# Patient Record
Sex: MALE | Race: WHITE | NOT HISPANIC OR LATINO | Employment: OTHER | URBAN - METROPOLITAN AREA
[De-identification: names, ages, dates, MRNs, and addresses within clinical notes are randomized per-mention and may not be internally consistent; named-entity substitution may affect disease eponyms.]

---

## 2022-08-05 ENCOUNTER — HOSPITAL ENCOUNTER (EMERGENCY)
Facility: CLINIC | Age: 37
Discharge: HOME | End: 2022-08-06
Attending: INTERNAL MEDICINE | Admitting: INTERNAL MEDICINE
Payer: COMMERCIAL

## 2022-08-05 ENCOUNTER — NURSE TRIAGE (OUTPATIENT)
Dept: NURSING | Facility: CLINIC | Age: 37
End: 2022-08-05

## 2022-08-05 DIAGNOSIS — U07.1 INFECTION DUE TO 2019 NOVEL CORONAVIRUS: ICD-10-CM

## 2022-08-05 LAB
ANION GAP SERPL CALCULATED.3IONS-SCNC: 10 MMOL/L (ref 7–15)
BASOPHILS # BLD AUTO: 0 10E3/UL (ref 0–0.2)
BASOPHILS NFR BLD AUTO: 1 %
BUN SERPL-MCNC: 15.4 MG/DL (ref 6–20)
CALCIUM SERPL-MCNC: 8.9 MG/DL (ref 8.6–10)
CHLORIDE SERPL-SCNC: 102 MMOL/L (ref 98–107)
CREAT SERPL-MCNC: 1.06 MG/DL (ref 0.67–1.17)
DEPRECATED HCO3 PLAS-SCNC: 26 MMOL/L (ref 22–29)
EOSINOPHIL # BLD AUTO: 0.1 10E3/UL (ref 0–0.7)
EOSINOPHIL NFR BLD AUTO: 1 %
ERYTHROCYTE [DISTWIDTH] IN BLOOD BY AUTOMATED COUNT: 12.9 % (ref 10–15)
GFR SERPL CREATININE-BSD FRML MDRD: >90 ML/MIN/1.73M2
GLUCOSE SERPL-MCNC: 114 MG/DL (ref 70–99)
HCT VFR BLD AUTO: 45 % (ref 40–53)
HGB BLD-MCNC: 15.2 G/DL (ref 13.3–17.7)
IMM GRANULOCYTES # BLD: 0 10E3/UL
IMM GRANULOCYTES NFR BLD: 0 %
LYMPHOCYTES # BLD AUTO: 0.8 10E3/UL (ref 0.8–5.3)
LYMPHOCYTES NFR BLD AUTO: 11 %
MCH RBC QN AUTO: 30.5 PG (ref 26.5–33)
MCHC RBC AUTO-ENTMCNC: 33.8 G/DL (ref 31.5–36.5)
MCV RBC AUTO: 90 FL (ref 78–100)
MONOCYTES # BLD AUTO: 0.6 10E3/UL (ref 0–1.3)
MONOCYTES NFR BLD AUTO: 7 %
NEUTROPHILS # BLD AUTO: 6 10E3/UL (ref 1.6–8.3)
NEUTROPHILS NFR BLD AUTO: 80 %
NRBC # BLD AUTO: 0 10E3/UL
NRBC BLD AUTO-RTO: 0 /100
PLATELET # BLD AUTO: 196 10E3/UL (ref 150–450)
POTASSIUM SERPL-SCNC: 4 MMOL/L (ref 3.4–5.3)
RBC # BLD AUTO: 4.99 10E6/UL (ref 4.4–5.9)
SODIUM SERPL-SCNC: 138 MMOL/L (ref 136–145)
TROPONIN T SERPL HS-MCNC: 9 NG/L
WBC # BLD AUTO: 7.5 10E3/UL (ref 4–11)

## 2022-08-05 PROCEDURE — 99284 EMERGENCY DEPT VISIT MOD MDM: CPT | Mod: 25

## 2022-08-05 PROCEDURE — 36415 COLL VENOUS BLD VENIPUNCTURE: CPT | Performed by: INTERNAL MEDICINE

## 2022-08-05 PROCEDURE — 96361 HYDRATE IV INFUSION ADD-ON: CPT

## 2022-08-05 PROCEDURE — 85379 FIBRIN DEGRADATION QUANT: CPT | Performed by: INTERNAL MEDICINE

## 2022-08-05 PROCEDURE — 80048 BASIC METABOLIC PNL TOTAL CA: CPT | Performed by: INTERNAL MEDICINE

## 2022-08-05 PROCEDURE — 93005 ELECTROCARDIOGRAM TRACING: CPT

## 2022-08-05 PROCEDURE — 96374 THER/PROPH/DIAG INJ IV PUSH: CPT

## 2022-08-05 PROCEDURE — 258N000003 HC RX IP 258 OP 636: Performed by: INTERNAL MEDICINE

## 2022-08-05 PROCEDURE — 84484 ASSAY OF TROPONIN QUANT: CPT | Performed by: INTERNAL MEDICINE

## 2022-08-05 PROCEDURE — 85025 COMPLETE CBC W/AUTO DIFF WBC: CPT | Performed by: INTERNAL MEDICINE

## 2022-08-05 PROCEDURE — 250N000011 HC RX IP 250 OP 636: Performed by: INTERNAL MEDICINE

## 2022-08-05 RX ORDER — KETOROLAC TROMETHAMINE 15 MG/ML
15 INJECTION, SOLUTION INTRAMUSCULAR; INTRAVENOUS ONCE
Status: COMPLETED | OUTPATIENT
Start: 2022-08-05 | End: 2022-08-05

## 2022-08-05 RX ORDER — SODIUM CHLORIDE 9 MG/ML
1000 INJECTION, SOLUTION INTRAVENOUS CONTINUOUS
Status: DISCONTINUED | OUTPATIENT
Start: 2022-08-05 | End: 2022-08-06 | Stop reason: HOSPADM

## 2022-08-05 RX ADMIN — KETOROLAC TROMETHAMINE 15 MG: 15 INJECTION, SOLUTION INTRAMUSCULAR; INTRAVENOUS at 23:28

## 2022-08-05 RX ADMIN — SODIUM CHLORIDE 1000 ML: 9 INJECTION, SOLUTION INTRAVENOUS at 23:29

## 2022-08-05 ASSESSMENT — ENCOUNTER SYMPTOMS
PALPITATIONS: 1
CHILLS: 1
MYALGIAS: 1
HEADACHES: 1

## 2022-08-06 VITALS
OXYGEN SATURATION: 96 % | RESPIRATION RATE: 18 BRPM | HEART RATE: 118 BPM | SYSTOLIC BLOOD PRESSURE: 137 MMHG | DIASTOLIC BLOOD PRESSURE: 79 MMHG | TEMPERATURE: 100.8 F

## 2022-08-06 LAB
D DIMER PPP FEU-MCNC: <0.27 UG/ML FEU (ref 0–0.5)
HOLD SPECIMEN: NORMAL
HOLD SPECIMEN: NORMAL
TROPONIN T SERPL HS-MCNC: 9 NG/L

## 2022-08-06 PROCEDURE — 36415 COLL VENOUS BLD VENIPUNCTURE: CPT | Performed by: INTERNAL MEDICINE

## 2022-08-06 PROCEDURE — 84484 ASSAY OF TROPONIN QUANT: CPT | Performed by: INTERNAL MEDICINE

## 2022-08-06 NOTE — TELEPHONE ENCOUNTER
Triage Call:    Patient calling to report new onset tachycardia and positive covid test.  He reports he tested positive with home antigen test today.  He had recent travel to [place].  He reports symptoms of headache, feels feverish, headache, tachycardia, sore throat, and constant chest pressure.  Patient reports heart rate at rest is 125-160.  He denies dizziness, weakness, sweat on face, or difficulty breathing.    According to the protocol, patient should go to ED now.  Care advice given. Patient verbalizes understanding and agrees with plan of care. Patient plans to go to [facility] ED.    Dilcia Jain RN  08/05/22 9:57 PM  [facility] Nurse Advisor    COVID 19 Nurse Triage Plan/Patient Instructions    Please be aware that novel coronavirus (COVID-19) may be circulating in the community. If you develop symptoms such as fever, cough, or SOB or if you have concerns about the presence of another infection including coronavirus (COVID-19), please contact your health care provider or visit [email or web address].     Disposition/Instructions    ED Visit recommended. Follow protocol based instructions.     Bring Your Own Device:  Please also bring your smart device(s) (smart phones, tablets, laptops) and their charging cables for your personal use and to communicate with your care team during your visit.    Thank you for taking steps to prevent the spread of this virus.  o Limit your contact with others.  o Wear a simple mask to cover your cough.  o Wash your hands well and often.    Resources    [facility]: About COVID-19: [email or web address]    CDC: What to Do If You're Sick: www.cdc.gov/coronavirus/2019-ncov/about/steps-when-sick.html    CDC: Ending Home Isolation: www.cdc.gov/coronavirus/2019-ncov/hcp/disposition-in-home-patients.html     CDC: Caring for Someone: www.cdc.gov/coronavirus/2019-ncov/if-you-are-sick/care-for-someone.html     [facility]: Interim Guidance for [facility]  Discharge to Home: www.health.[facility].mn.us/diseases/coronavirus/hcp/hospdischarge.pdf    [facility] clinical trials (COVID-19 research studies): [email or web address]-[email or web address]     Below are the COVID-19 hotlines at the [place] Department of Health ([facility]). Interpreters are available.   o For health questions: Call 868-003-5887 or 1-767.955.8091 (7 a.m. to 7 p.m.)  o For questions about schools and childcare: Call 328-923-0643 or 1-652.380.6696 (7 a.m. to 7 p.m.)       Reason for Disposition    SEVERE or constant chest pain or pressure  (Exception: Mild central chest pain, present only when coughing.)    Additional Information    Negative: SEVERE difficulty breathing (e.g., struggling for each breath, speaks in single words)    Negative: Difficult to awaken or acting confused (e.g., disoriented, slurred speech)    Negative: Shock suspected (e.g., cold/pale/clammy skin, too weak to stand, low BP, rapid pulse)    Negative: Bluish (or gray) lips or face now    Negative: Sounds like a life-threatening emergency to the triager    Negative: [1] Diagnosed or suspected COVID-19 AND [2] symptoms lasting 3 or more weeks    Negative: [1] COVID-19 exposure AND [2] no symptoms    Negative: COVID-19 vaccine reaction suspected (e.g., fever, headache, muscle aches) occurring 1 to 3 days after getting vaccine    Negative: COVID-19 vaccine, questions about    Negative: [1] Lives with someone known to have influenza (flu test positive) AND [2] flu-like symptoms (e.g., cough, runny nose, sore throat, SOB; with or without fever)    Negative: [1] Adult with possible COVID-19 symptoms AND [2] triager concerned about severity of symptoms or other causes    Negative: COVID-19 and breastfeeding, questions about    Protocols used: CORONAVIRUS (COVID-19) DIAGNOSED OR [id number] 1.18.2022

## 2022-08-06 NOTE — ED TRIAGE NOTES
"Flew home from [place] last night. Flu like symptoms since this morning. Apple watch kept telling pt that \"HR too high\". Pt double vaxxed and double boosted for covid. Home Covid test positive this morning.       "

## 2022-08-06 NOTE — ED PROVIDER NOTES
"  History   Chief Complaint:  Chest Pain     The history is provided by the patient.       Shantanu Woodruff is an otherwise healthy 37 year old male who presents for evaluation of chest pain and flu-like symptoms since initial onset earlier today. The patient reports flying back from a business trip in [place] last night, afterwards finding out that multiple people he had been around tested positive for Covid. That night, he took 2 Covid tests which both came back negative. This morning, he woke up feeling generally ill with myalgias and throughout the day became chilled, putting on extra layers of clothing to \"bundle up\", and developed a headache. Around 1600 today, he went to take a nap when his apple watch began beeping and notifying him that his heart rate was \"too high\". Since then it has fluctuated between 120-160. Presently, he reports pain in his left anterior chest along with palpitations. He notes drinking 6 bottles of water today, but has only urinated twice.    Review of Systems   Constitutional: Positive for chills.   Cardiovascular: Positive for chest pain and palpitations.   Musculoskeletal: Positive for myalgias.   Neurological: Positive for headaches.   All other systems reviewed and are negative.      Allergies:  The patient has no known allergies.     Medications:  The patient is not currently taking any prescribed medications.    Past Medical History:     The patient denies any significant past medical history.    Family History:    Heart defect  Breast cancer  Thyroid disease    Social History:  The patient presents to the ED alone.  The patient arrived to the ED in a private vehicle.    Physical Exam     Patient Vitals for the past 24 hrs:   BP Temp Temp src Pulse Resp SpO2   08/06/22 0145 137/79 -- -- 114 -- 95 %   08/06/22 0130 -- -- -- 112 -- 95 %   08/06/22 0115 (!) 172/94 -- -- 120 -- 94 %   08/06/22 0100 (!) 165/105 -- -- 116 -- 95 %   08/06/22 0045 (!) 164/90 -- -- 120 -- 96 % "   08/06/22 0030 -- -- -- (!) 121 -- 94 %   08/06/22 0015 (!) 154/85 -- -- 112 -- 93 %   08/06/22 0000 (!) 156/87 -- -- (!) 124 -- 93 %   08/05/22 2345 (!) 159/106 -- -- 112 -- 95 %   08/05/22 2300 (!) 144/90 -- -- (!) 129 -- 96 %   08/05/22 2250 -- -- -- (!) 141 -- 99 %   08/05/22 2245 131/73 -- -- (!) 129 -- --   08/05/22 2226 (!) 156/103 (!) 100.8  F (38.2  C) Temporal (!) 145 18 97 %     Physical Exam  Constitutional:       Comments: Pleasant and cooperative   HENT:      Right Ear: Tympanic membrane normal.      Left Ear: Tympanic membrane normal.      Mouth/Throat:      Pharynx: No posterior oropharyngeal erythema.   Eyes:      Conjunctiva/sclera: Conjunctivae normal.   Cardiovascular:      Rate and Rhythm: Regular rhythm. Tachycardia present.      Heart sounds: Normal heart sounds.   Pulmonary:      Effort: Pulmonary effort is normal.      Breath sounds: Normal breath sounds.   Abdominal:      General: Bowel sounds are normal. There is no distension.      Palpations: Abdomen is soft.      Tenderness: There is no abdominal tenderness. There is no guarding or rebound.   Musculoskeletal:         General: Normal range of motion.      Cervical back: Neck supple.   Skin:     General: Skin is warm and dry.   Neurological:      Mental Status: He is alert.         Emergency Department Course   ECG  ECG taken at 2239, ECG read at 2306  Sinus tachycardia  Possible left atrial enlargement  Borderline ECG  Rate 129 bpm. SD interval 170 ms. QRS duration 80 ms. QT/QTc 286/418 ms. P-R-T axis 41 21 63.     Laboratory:  Labs Ordered and Resulted from Time of ED Arrival to Time of ED Departure   BASIC METABOLIC PANEL - Abnormal       Result Value    Creatinine 1.06      Sodium 138      Potassium 4.0      Urea Nitrogen 15.4      Chloride 102      Carbon Dioxide (CO2) 26      Anion Gap 10      Glucose 114 (*)     GFR Estimate >90      Calcium 8.9     TROPONIN T, HIGH SENSITIVITY - Normal    Troponin T, High Sensitivity 9     D  DIMER QUANTITATIVE - Normal    D-Dimer Quantitative <0.27     TROPONIN T, HIGH SENSITIVITY - Normal    Troponin T, High Sensitivity 9     CBC WITH PLATELETS AND DIFFERENTIAL    WBC Count 7.5      RBC Count 4.99      Hemoglobin 15.2      Hematocrit 45.0      MCV 90      MCH 30.5      MCHC 33.8      RDW 12.9      Platelet Count 196      % Neutrophils 80      % Lymphocytes 11      % Monocytes 7      % Eosinophils 1      % Basophils 1      % Immature Granulocytes 0      NRBCs per 100 WBC 0      Absolute Neutrophils 6.0      Absolute Lymphocytes 0.8      Absolute Monocytes 0.6      Absolute Eosinophils 0.1      Absolute Basophils 0.0      Absolute Immature Granulocytes 0.0      Absolute NRBCs 0.0        Emergency Department Course:     Reviewed:  I reviewed nursing notes, vitals, past medical history and Care Everywhere    Assessments:  2254 I obtained history and examined the patient as noted above.   0149 I rechecked the patient and explained findings. I believe that they are safe for discharge at this time.    Interventions:  2328 Toradol 156 mg IV  2329 NS 1 L IV    Disposition:  The patient was discharged to home.     Impression & Plan     Medical Decision Making:    Shantanu Woodruff is a 37 year old male who presents with signs and symptoms consistent with COVID-19 infection which is confirmed by home testing.  He was concerned particularly about his rapid heart rate.  Here he is consistently in a sinus tachycardia.  With fluids and Toradol his tachycardia has improved and he is feeling much better.  Discussed with the patient treatment with Paxlovid.  He is at elevated risk due to obesity.  He prefers to be treated.  He is on no prescription medications.     Diagnosis:    ICD-10-CM    1. Infection due to 2019 novel coronavirus  U07.1      Discharge Medications:  New Prescriptions    NIRMATRELVIR AND RITONAVIR (PAXLOVID) THERAPY PACK    Take 3 tablets by mouth 2 times daily for 5 days     Scribe Disclosure:  I,  Roya Hoyt, am serving as a scribe at 10:52 PM on 8/5/2022 to document services personally performed by Blanca Peralta MD based on my observations and the provider's statements to me.        Blanca Peralta MD  08/06/22 0206

## 2022-08-08 LAB
ATRIAL RATE - MUSE: 129 BPM
DIASTOLIC BLOOD PRESSURE - MUSE: NORMAL MMHG
INTERPRETATION ECG - MUSE: NORMAL
P AXIS - MUSE: 41 DEGREES
PR INTERVAL - MUSE: 170 MS
QRS DURATION - MUSE: 80 MS
QT - MUSE: 286 MS
QTC - MUSE: 418 MS
R AXIS - MUSE: 21 DEGREES
SYSTOLIC BLOOD PRESSURE - MUSE: NORMAL MMHG
T AXIS - MUSE: 63 DEGREES
VENTRICULAR RATE- MUSE: 129 BPM